# Patient Record
Sex: MALE | Race: WHITE
[De-identification: names, ages, dates, MRNs, and addresses within clinical notes are randomized per-mention and may not be internally consistent; named-entity substitution may affect disease eponyms.]

---

## 2020-07-08 ENCOUNTER — TRANSCRIBE ORDERS (OUTPATIENT)
Dept: OTHER | Age: 50
End: 2020-07-08

## 2020-07-08 DIAGNOSIS — L40.50 PSORIATIC ARTHRITIS (H): Primary | ICD-10-CM

## 2020-10-07 ENCOUNTER — VIRTUAL VISIT (OUTPATIENT)
Dept: RHEUMATOLOGY | Facility: CLINIC | Age: 50
End: 2020-10-07
Attending: INTERNAL MEDICINE
Payer: MEDICARE

## 2020-10-07 DIAGNOSIS — E66.09 OBESITY DUE TO EXCESS CALORIES WITHOUT SERIOUS COMORBIDITY, UNSPECIFIED CLASSIFICATION: ICD-10-CM

## 2020-10-07 DIAGNOSIS — L40.50 PSORIATIC ARTHRITIS (H): ICD-10-CM

## 2020-10-07 DIAGNOSIS — L40.9 PSORIASIS: ICD-10-CM

## 2020-10-07 DIAGNOSIS — M15.0 PRIMARY OSTEOARTHRITIS INVOLVING MULTIPLE JOINTS: ICD-10-CM

## 2020-10-07 DIAGNOSIS — M19.90 INFLAMMATORY ARTHRITIS: Primary | ICD-10-CM

## 2020-10-07 PROCEDURE — 99204 OFFICE O/P NEW MOD 45 MIN: CPT | Mod: 95 | Performed by: STUDENT IN AN ORGANIZED HEALTH CARE EDUCATION/TRAINING PROGRAM

## 2020-10-07 RX ORDER — PREGABALIN 150 MG/1
150 CAPSULE ORAL
COMMUNITY
Start: 2019-05-15

## 2020-10-07 RX ORDER — OMEGA-3 FATTY ACIDS/FISH OIL 300-1000MG
200 CAPSULE ORAL EVERY 4 HOURS PRN
COMMUNITY

## 2020-10-07 RX ORDER — MINOCYCLINE HYDROCHLORIDE 100 MG/1
100 CAPSULE ORAL
COMMUNITY
Start: 2020-07-30

## 2020-10-07 RX ORDER — ACETAMINOPHEN 500 MG
500-1000 TABLET ORAL EVERY 6 HOURS PRN
COMMUNITY

## 2020-10-07 RX ORDER — HYDROMORPHONE HYDROCHLORIDE 4 MG/1
TABLET ORAL
COMMUNITY
Start: 2020-10-07

## 2020-10-07 RX ORDER — DEXTROAMPHETAMINE SACCHARATE, AMPHETAMINE ASPARTATE, DEXTROAMPHETAMINE SULFATE AND AMPHETAMINE SULFATE 5; 5; 5; 5 MG/1; MG/1; MG/1; MG/1
20 TABLET ORAL
COMMUNITY
Start: 2020-02-05

## 2020-10-07 RX ORDER — PREDNISONE 10 MG/1
10 TABLET ORAL
COMMUNITY
Start: 2020-09-10

## 2020-10-07 RX ORDER — BUTALBITAL, ASPIRIN AND CAFFEINE 50; 325; 40 MG/1; MG/1; MG/1
1 TABLET ORAL
COMMUNITY

## 2020-10-07 RX ORDER — RIBOFLAVIN (VITAMIN B2) 100 MG
25 TABLET ORAL DAILY
COMMUNITY

## 2020-10-07 RX ORDER — POLYETHYLENE GLYCOL 3350 17 G/17G
17 POWDER, FOR SOLUTION ORAL
COMMUNITY
Start: 2020-07-07

## 2020-10-07 RX ORDER — ONDANSETRON 8 MG/1
8 TABLET, FILM COATED ORAL
COMMUNITY

## 2020-10-07 RX ORDER — ALPRAZOLAM 1 MG
TABLET ORAL
COMMUNITY
Start: 2020-04-03

## 2020-10-07 RX ORDER — CYCLOBENZAPRINE HCL 10 MG
10 TABLET ORAL
COMMUNITY
Start: 2019-09-17

## 2020-10-07 RX ORDER — MORPHINE SULFATE 15 MG/1
15 TABLET, FILM COATED, EXTENDED RELEASE ORAL
COMMUNITY
Start: 2020-09-18

## 2020-10-07 RX ORDER — SUMATRIPTAN 100 MG/1
TABLET, FILM COATED ORAL
COMMUNITY
Start: 2020-04-06

## 2020-10-07 RX ORDER — MENTHOL/CAMPHOR 0.5 %-0.5%
LOTION (ML) TOPICAL EVERY 6 HOURS PRN
COMMUNITY

## 2020-10-07 NOTE — PROGRESS NOTES
"Dimas Weathers is a 50 year old male who is being evaluated via a billable video visit.      The patient has been notified of following:     \"This video visit will be conducted via a call between you and your physician/provider. We have found that certain health care needs can be provided without the need for an in-person physical exam.  This service lets us provide the care you need with a video conversation.  If a prescription is necessary we can send it directly to your pharmacy.  If lab work is needed we can place an order for that and you can then stop by our lab to have the test done at a later time.    Video visits are billed at different rates depending on your insurance coverage.  Please reach out to your insurance provider with any questions.    If during the course of the call the physician/provider feels a video visit is not appropriate, you will not be charged for this service.\"    Patient has given verbal consent for Video visit? Yes  How would you like to obtain your AVS? MyChart  If you are dropped from the video visit, the video invite should be resent to: Text to cell phone: [phone]  Will anyone else be joining your video visit? No      Marisa Kay CMA           Active Problem List:     Patient Active Problem List    Diagnosis Date Noted     Osteoarthritis      Priority: Medium     Psoriasis      Priority: Medium     Obesity      Priority: Medium            History of Present Illness:   Dimas Weathers is a 50 year old male with PMH of Osteoarthritis, Psoriasis, chronic pain, PsA evaluated via a billable virtual visit in consultation at request of Dr Richards for evaluation of joint pains.     He has pain in his joints for 8 - 9 years. It started in his fingers, toes then involved other joints including knees, ankles, shoulders.  He was seen by Dr. Yancey in Rheumatology at [facility] in 2013 for shoulder pain. MRI of the shoulder done in 4/2013 showed extensive SLAP type tear extending into " the posterior glenoid labrum.  Trace rotator cuff tendinopathy without discrete tear was noted.  No other evidence of inflammatory arthritis was found and was referred to orthopedic.  But he reports having persistent pain in his joints and was reevaluated by Dr. Prasanth Marie in rheumatology in 2015.  He had pain in his ankles, knees, hands with swelling over the right ankle.  He underwent whole-body nuclear scan which showed asymmetric arthritic involvement of the medial compartment of the right knee, right tibiofibular articulation, left ankle and right foot.  Asymmetric active arthritis of the carpal bones were noted as well.  He was given a diagnosis of psoriatic arthritis and was started on Voltaren 75 mg twice daily and Remicade infusion in 3/2015.  He noted mild improvement but still was reporting significant joint pain flareups requiring prednisone.  The Remicade infusion was increased to every 6 weeks after few months but still reported significant flareups.  Remicade was switched to Humira in 10/15 and then after a few months to Enbrel.  Nothing seemed to work.  He just reported improvement with the prednisone.  Over the years he has been on prednisone ranging from 10 to 30 mg daily.  He established care with Dr. Baker in 2018 and was started on Cosentyx and prednisone 30 mg daily, he was not able to taper the prednisone down below 10 mg daily for more than a year.  Otezla was added to Cosentyx in 2019.  He still had significant joint pain flareups and Cosentyx was changed to Taltz.    He mentions that on Cosentyx he had more ankle and leg swelling.  With Taltz he had a severe reaction.  He reports chronic swelling in his left ankle at the area of his prior surgery with hardware.  He has a right knee total arthroplasty, reports pain in left knee. Both ankles and wrists are terrible now.  He has chronic swelling over his wrists.  He has pain in his hands right across the knuckles.  He believes that swelling  in his joints reduce when he is on higher doses of prednisone.    He is on minocycline 100 mg twice daily for 4 months.  Minocycline was started for tooth ache and noticed improvement in his joint pains and swelling as well with it.  He requested Dr. Parra to continue minocycline. He feels minocycline is keeping things stable, he still has some rough nights and stiffness in the morning but it is better.  He takes prednisone on as needed basis. He is also taking a lot of Ibuprofen and tyelnol everyday.      6 years ago he had knee replacement and after the surgery developed DVT.  After ankle surgery he developed DVT as well. He has Psoriasis on his elbows, scalp, chin.     No history of ulcerative colitis or chron's disease. No h/o iritis, enthesitis, finger or toe swelling like dactylitis, plantar fascitis or heel pain. Denies any raynauds, malar rash, photosensitivity, recurrent mouth/genital ulcers, sicca symptoms, pleuritic chest pains, recurrent sinusitis/rhinitis, swallowing difficulty, hearing or visual changes recently.            Review of Systems:     Review Of Systems  Constitutional: denies fever, chills, night sweats and weight loss.  Skin: + skin rash.  Eyes: No dryness or irritation in eyes. No episode of eye inflammation or redness.   Ears/Nose/Throat: no recurrent sinus infections.  Respiratory: No shortness of breath, dyspnea on exertion, cough, or hemoptysis  Cardiovascular: no chest pain or palpitations.  Gastrointestinal: no nausea, vomiting, abdominal pain.  Normal bowel movements.  Genitourinary: no dysuria, frequency  or hematuria.  Musculoskeletal: as in HPI  Neurologic: no numbness, tingling.  Psychiatric: no mood disorders.  Hematologic/Lymphatic/Immunologic: no history of easy bruising, petechia or purpura.  No abnormal bleeding.   Endocrine: no h/o thyroid disease or Diabetes.                  Past Medical History:     Past Medical History:   Diagnosis Date     Chronic back pain       Obesity      Osteoarthritis      Psoriasis      Past Surgical History:   Procedure Laterality Date     ANKLE SURGERY       right TKA Right             Social History:     Social History     Occupational History     Not on file   Tobacco Use     Smoking status: Not on file   Substance and Sexual Activity     Alcohol use: Not on file     Drug use: Not on file     Sexual activity: Not on file            Family History:   No family history on file.         Allergies:     Allergies   Allergen Reactions     Penicillins Rash     States he had a rash but not sure if he really has an allergy to it              Medications:     Current Outpatient Medications   Medication Sig Dispense Refill     acetaminophen (TYLENOL) 500 MG tablet Take 500-1,000 mg by mouth every 6 hours as needed for mild pain       ALPRAZolam (XANAX) 1 MG tablet Take 1 tablet up to TID prn anxiety       amphetamine-dextroamphetamine (ADDERALL) 20 MG tablet Take 20 mg by mouth       ASPIRIN 81 PO        butalbital-aspirin-caffeine (FIORINAL EQUIV) -40 MG TABS per tablet Take 1 tablet by mouth       camphor-menthol (SARNA) 0.5-0.5 % external lotion Apply topically every 6 hours as needed for skin care       cholecalciferol 125 MCG (5000 UT) CAPS Take 5,000 Units by mouth       cyclobenzaprine (FLEXERIL) 10 MG tablet Take 10 mg by mouth       HYDROmorphone (DILAUDID) 4 MG tablet Take one-two  Tabs.  every 4-6  hours as needed , max Six tab per day. Use date:10/08/20-11/06/20       ibuprofen (ADVIL/MOTRIN) 200 MG capsule Take 200 mg by mouth every 4 hours as needed for fever       loratadine-pseudoePHEDrine (CLARITIN-D 24-HOUR)  MG 24 hr tablet once daily.       magnesium oxide (MAG-OX) 400 (241.3 Mg) MG tablet Take 400 mg by mouth       minocycline (MINOCIN) 100 MG capsule Take 100 mg by mouth       morphine (MS CONTIN) 15 MG CR tablet Take 15 mg by mouth       omeprazole (PRILOSEC) 20 MG DR capsule Take 20 mg by mouth       ondansetron (ZOFRAN) 8  MG tablet Take 8 mg by mouth       polyethylene glycol (MIRALAX) 17 GM/Dose powder Take 17 g by mouth       predniSONE (DELTASONE) 10 MG tablet Take 10 mg by mouth       pregabalin (LYRICA) 150 MG capsule Take 150 mg by mouth       riboflavin (VITAMIN  B-2) 100 MG TABS tablet Take 25 mg by mouth daily       SUMAtriptan (IMITREX) 100 MG tablet 1 TAB BY MOUTH AT THE ONSET OF THE HEADACHE; MAY REPEAT ONCE AFTER 2 HOURS IF HEADACHE PERSISTS OR RECURS              Physical Exam:   Vitals not taken.   Wt Readings from Last 4 Encounters:   No data found for Wt       Constitutional: Obese,  appearing stated age; cooperative  MS: Bilateral ankle swelling and tenderness reported.  Tenderness reported over bilateral wrists, MCP, PIP joints.  No dactylitis, Raynaud's noted.  Psych: nl judgement, orientation, memory, affect.         Data:       Outside studies reviewed: Records from Dr Baker, Dr Marie office reviewed     Reviewed Rheumatology lab flowsheet    Assessment     Psoriatic arthritis  Psoriasis  Primary osteoarthritis-status post right TKA  Chronic pain  Lumbar degenerative disc disease  Lower extremity edema    Psoriatic arthritis: Reports pain in his joints since 2013.  Mainly in the bilateral ankles, knees, wrists and shoulders.  He has tried multiple immunosuppressive medications including methotrexate, Humira, Enbrel, Remicade infusions, Cosentyx, Taltz and Otezla with minimal to no improvement.  He has used prednisone moderate to high doses over the years and believes that it helps with his lower extremity swelling.  Swelling in his lower extremities seem to be related to soft tissue edema and fluid retention which could be consequence of chronic steroid use.  I agree with limiting steroid use.  Although MRI of right hand done in July 2020 showed mild thickening and enhancement of the wrist synovium suggestive of inflammatory process.  He follows up with Dr. Baker who recommended him to try other medications  for psoriatic arthritis - Stelara or Orencia or Xeljanz.  Any of these medications can be tried to help with underlying inflammation.  But majority of his pain is related to osteoarthritis, deconditioning, lower extremity edema which will not improve with immune suppression.  He also strongly believes that minocycline is helping him.  Minocycline has some anti-inflammatory properties but it is not DMARD agent.  I have not used minocycline for inflammatory arthritis.  He can discuss that with Dr. Baker.    I have ordered some tests including inflammation markers and rheumatoid serologies to rule out associated rheumatoid arthritis.    Plan     Blood tests ordered     MRI of the right hand done in July 2020 did show evidence of inflammatory arthritis    I agree with Dr. Baker's recommendation and patient would follow-up with him at [place].        Video-Visit Details    Type of service:  Video Visit    Video Start Time: 2:30 PM   Video End Time: 3:30 PM     Originating Location (pt. Location): Home    Distant Location (provider location):  [facility]     Platform used for Video Visit: Rober Doyle MD

## 2020-10-07 NOTE — PATIENT INSTRUCTIONS
Blood tests ordered. Need to be faxed.     Your recent MRI  Reports show inflammation in the joints     He will follow up with his local Rheumatologist at [facility].

## 2021-05-25 ENCOUNTER — RECORDS - HEALTHEAST (OUTPATIENT)
Dept: ADMINISTRATIVE | Facility: CLINIC | Age: 51
End: 2021-05-25

## 2021-05-26 ENCOUNTER — RECORDS - HEALTHEAST (OUTPATIENT)
Dept: ADMINISTRATIVE | Facility: CLINIC | Age: 51
End: 2021-05-26

## 2021-05-30 ENCOUNTER — RECORDS - HEALTHEAST (OUTPATIENT)
Dept: ADMINISTRATIVE | Facility: CLINIC | Age: 51
End: 2021-05-30

## 2021-05-31 ENCOUNTER — RECORDS - HEALTHEAST (OUTPATIENT)
Dept: ADMINISTRATIVE | Facility: CLINIC | Age: 51
End: 2021-05-31

## 2021-06-01 ENCOUNTER — RECORDS - HEALTHEAST (OUTPATIENT)
Dept: ADMINISTRATIVE | Facility: CLINIC | Age: 51
End: 2021-06-01

## 2021-06-02 ENCOUNTER — RECORDS - HEALTHEAST (OUTPATIENT)
Dept: ADMINISTRATIVE | Facility: CLINIC | Age: 51
End: 2021-06-02